# Patient Record
Sex: MALE | Race: WHITE
[De-identification: names, ages, dates, MRNs, and addresses within clinical notes are randomized per-mention and may not be internally consistent; named-entity substitution may affect disease eponyms.]

---

## 2018-11-02 ENCOUNTER — HOSPITAL ENCOUNTER (EMERGENCY)
Dept: HOSPITAL 74 - FER | Age: 63
Discharge: HOME | End: 2018-11-02
Payer: COMMERCIAL

## 2018-11-02 VITALS — TEMPERATURE: 98.2 F | SYSTOLIC BLOOD PRESSURE: 160 MMHG | HEART RATE: 81 BPM | DIASTOLIC BLOOD PRESSURE: 78 MMHG

## 2018-11-02 VITALS — BODY MASS INDEX: 28.8 KG/M2

## 2018-11-02 DIAGNOSIS — S93.402A: Primary | ICD-10-CM
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
## 2018-11-02 DIAGNOSIS — F17.210: ICD-10-CM

## 2018-11-02 DIAGNOSIS — E78.00: ICD-10-CM

## 2018-11-02 DIAGNOSIS — I10: ICD-10-CM
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
## 2018-11-02 NOTE — PDOC
History of Present Illness





- General


History Source: Patient


Exam Limitations: No Limitations





- History of Present Illness


Initial Comments: 





11/02/18 20:36


The patient is a 62 year old male, with a significant PMH of HTN and HLD who 

presents to the emergency department with pain to his left lower extremity. 

Patient states that in September he injured his left heel while in a pool, and 

has been wearing a foot insert since. Patient reports that today while going up 

the steps to get to the bus he heard a "snap" on his left leg. Patient notes 

that he has been walking differently since September secondary to the pain. 





The patient denies chest pain, shortness of breath, headache and dizziness.


Denies fever, chills, nausea, vomit, diarrhea and constipation.


Denies dysuria, frequency, urgency and hematuria.





Allergies: NKA


Past surgical history: None reported. 


Social history: No reported alcoghol, drug or cigarette use. 


PCP: Dr. Otoole 


 





<Sosa Miller - Last Filed: 11/02/18 21:22>





<Hunter Silverman - Last Filed: 11/02/18 23:27>





- General


Chief Complaint: Injury


Stated Complaint: LT LOWER LEG PAIN


Time Seen by Provider: 11/02/18 20:00





Past History





<Sosa Miller - Last Filed: 11/02/18 21:22>





- Past Medical History


COPD: No


HTN: Yes


Hypercholesterolemia: Yes





- Suicide/Smoking/Psychosocial Hx


Smoking History: Current every day smoker


Number of Cigarettes Smoked Daily: 10


Information on smoking cessation initiated: Yes


'Breaking Loose' booklet given: 11/02/18





<Hunter Silverman - Last Filed: 11/02/18 23:27>





- Past Medical History


Allergies/Adverse Reactions: 


 Allergies











Allergy/AdvReac Type Severity Reaction Status Date / Time


 


No Known Allergies Allergy   Unverified 11/02/18 19:51











Home Medications: 


Ambulatory Orders





Amlodipine Besylate 10 mg PO DAILY 11/02/18 


Lisinopril 20 mg PO DAILY 11/02/18 


Lovastatin 20 mg PO DAILY 11/02/18 


Omeprazole 20 mg PO DAILY 11/02/18 











**Review of Systems





- Review of Systems


Able to Perform ROS?: Yes


Comments:: 





11/02/18 20:35


ROS:  A complete review of 10 out of 10 review of systems is taken and is 

negative apart from what is previously mentioned below and in the HPI.








<Sosa Miller - Last Filed: 11/02/18 21:22>





*Physical Exam





- Vital Signs


 Last Vital Signs











Temp Pulse Resp BP Pulse Ox


 


 98.2 F   81   14   160/78   98 


 


 11/02/18 19:50  11/02/18 19:50  11/02/18 19:50  11/02/18 19:50  11/02/18 19:50














- Physical Exam


Comments: 





11/02/18 20:35





Vitals: Triage vital signs reviewed


General Appearance: No acute distress, well nourished, well developed


Cardiac: Regular rate and rhythm, no murmurs, no rubs, no gallops


Lungs: Clear to auscultation bilateral, good air movement bilaterally


Extremities: (+) Left heel tenderness to palpation. (+) Left lateral tenderness 

to palpation superior to the lateral malleolus. No obvious deformity. 

neurovascularly intact. good strength. no cyanosis, clubbing, or edema


Skin:  Warm and dry, no rashes or lesions, no rash, no petechiae


Neuro:  AOX3; Cranial Nerves 2-12 grossly intact, Strength intact to all 

extremities, Sensation intact to all extremities.


Psych: Normal mood, normal affect


 








<Sosa Miller - Last Filed: 11/02/18 21:22>





- Vital Signs


 Last Vital Signs











Temp Pulse Resp BP Pulse Ox


 


 98.2 F   81   14   160/78   98 


 


 11/02/18 19:50  11/02/18 19:50  11/02/18 19:50  11/02/18 19:50  11/02/18 19:50














<Hunter Silverman - Last Filed: 11/02/18 23:27>





ED Treatment Course





- RADIOLOGY


Radiology Studies Ordered: 














 Category Date Time Status


 


 ANKLE & FOOT-LEFT* [RAD] Stat Radiology  11/02/18 20:10 Ordered














<Hunter Silverman - Last Filed: 11/02/18 23:27>





Medical Decision Making





- Medical Decision Making





11/02/18 20:40





62 years old presents with pain to left heel and mid ankle laterally after 

missing a step going up a bus. Thomas a pop.  Achilles intact.





No obvious deformity neurovascularly intact good strength we'll x-ray and 

reassess





Reevaluation x-ray demonstrates no acute fracture dislocation there is some 

swelling under the heel





We'll recommend nonweightbearing Aircast crutches and ortho follow-up.





Findings, the need for follow-up and strict return instructions discussed with 

patient.














<Hunter Silverman - Last Filed: 11/02/18 23:27>





*DC/Admit/Observation/Transfer





<Sosa Miller - Last Filed: 11/02/18 21:22>





- Discharge Dispostion


Decision to Admit order: No





<Hunter Silverman - Last Filed: 11/02/18 23:27>


Diagnosis at time of Disposition: 


Ankle sprain


Qualifiers:


 Encounter type: initial encounter Involved ligament of ankle: unspecified 

ligament Laterality: left Qualified Code(s): S93.402A - Sprain of unspecified 

ligament of left ankle, initial encounter








- Discharge Dispostion


Disposition: HOME


Condition at time of disposition: Stable





- Referrals


Referrals: 


Rudy Otoole MD [Primary Care Provider] - 


Alon Kevin MD [Staff Physician] - 





- Patient Instructions


Printed Discharge Instructions:  Ankle Sprain


Additional Instructions: 


Keep foot elevated as much as possible ice 20 minutes on 20 minutes off. No 

weight on foot. Use air cast and crutches at all times ambulating. On Monday 

follow-up with Dr. Kevin orthopedics. Take over-the-counter Motrin as directed 

on package as needed for pain. Return to ED for any severe worsening symptoms 

or for any concerns.





- Post Discharge Activity